# Patient Record
Sex: FEMALE | Race: WHITE | NOT HISPANIC OR LATINO | Employment: PART TIME | URBAN - METROPOLITAN AREA
[De-identification: names, ages, dates, MRNs, and addresses within clinical notes are randomized per-mention and may not be internally consistent; named-entity substitution may affect disease eponyms.]

---

## 2017-01-27 ENCOUNTER — GENERIC CONVERSION - ENCOUNTER (OUTPATIENT)
Dept: OTHER | Facility: OTHER | Age: 57
End: 2017-01-27

## 2017-10-19 ENCOUNTER — GENERIC CONVERSION - ENCOUNTER (OUTPATIENT)
Dept: OTHER | Facility: OTHER | Age: 57
End: 2017-10-19

## 2017-10-26 ENCOUNTER — GENERIC CONVERSION - ENCOUNTER (OUTPATIENT)
Dept: OTHER | Facility: OTHER | Age: 57
End: 2017-10-26

## 2017-10-26 DIAGNOSIS — Z12.31 ENCOUNTER FOR SCREENING MAMMOGRAM FOR MALIGNANT NEOPLASM OF BREAST: ICD-10-CM

## 2017-10-26 DIAGNOSIS — Z01.419 ENCOUNTER FOR GYNECOLOGICAL EXAMINATION WITHOUT ABNORMAL FINDING: ICD-10-CM

## 2017-10-26 PROCEDURE — G0145 SCR C/V CYTO,THINLAYER,RESCR: HCPCS | Performed by: OBSTETRICS & GYNECOLOGY

## 2017-10-26 PROCEDURE — 87624 HPV HI-RISK TYP POOLED RSLT: CPT | Performed by: OBSTETRICS & GYNECOLOGY

## 2017-10-30 ENCOUNTER — LAB REQUISITION (OUTPATIENT)
Dept: LAB | Facility: HOSPITAL | Age: 57
End: 2017-10-30
Payer: COMMERCIAL

## 2017-10-30 DIAGNOSIS — Z12.4 ENCOUNTER FOR SCREENING FOR MALIGNANT NEOPLASM OF CERVIX: ICD-10-CM

## 2017-10-30 DIAGNOSIS — Z11.51 ENCOUNTER FOR SCREENING FOR HUMAN PAPILLOMAVIRUS (HPV): ICD-10-CM

## 2017-10-31 LAB — HPV RRNA GENITAL QL NAA+PROBE: NORMAL

## 2017-11-02 LAB
LAB AP GYN PRIMARY INTERPRETATION: NORMAL
Lab: NORMAL

## 2017-11-06 ENCOUNTER — GENERIC CONVERSION - ENCOUNTER (OUTPATIENT)
Dept: OTHER | Facility: OTHER | Age: 57
End: 2017-11-06

## 2017-11-10 ENCOUNTER — GENERIC CONVERSION - ENCOUNTER (OUTPATIENT)
Dept: OTHER | Facility: OTHER | Age: 57
End: 2017-11-10

## 2017-11-28 ENCOUNTER — GENERIC CONVERSION - ENCOUNTER (OUTPATIENT)
Dept: OTHER | Facility: OTHER | Age: 57
End: 2017-11-28

## 2017-11-30 ENCOUNTER — GENERIC CONVERSION - ENCOUNTER (OUTPATIENT)
Dept: OTHER | Facility: OTHER | Age: 57
End: 2017-11-30

## 2017-11-30 ENCOUNTER — LAB REQUISITION (OUTPATIENT)
Dept: LAB | Facility: HOSPITAL | Age: 57
End: 2017-11-30
Payer: COMMERCIAL

## 2017-11-30 DIAGNOSIS — N89.8 OTHER SPECIFIED NONINFLAMMATORY DISORDERS OF VAGINA (CODE): ICD-10-CM

## 2017-11-30 PROCEDURE — 88305 TISSUE EXAM BY PATHOLOGIST: CPT | Performed by: OBSTETRICS & GYNECOLOGY

## 2017-11-30 PROCEDURE — 88312 SPECIAL STAINS GROUP 1: CPT | Performed by: OBSTETRICS & GYNECOLOGY

## 2017-12-14 ENCOUNTER — GENERIC CONVERSION - ENCOUNTER (OUTPATIENT)
Dept: OTHER | Facility: OTHER | Age: 57
End: 2017-12-14

## 2018-01-10 NOTE — MISCELLANEOUS
Message   Recorded as Task   Date: 09/03/2016 10:29 AM, Created By: Essence Luna   Task Name: Go to Result   Assigned To: Bertha Alba   Regarding Patient: Niles Quintanilla, Status: Active   CommentSilana Helton - 03 Sep 2016 10:29 AM     TASK CREATED  normal 3D mammogram, dense tissue, her risk is calculated as very high, 40%, shoudl consider breast MRI, possible genetic counseling or visit with breast surgeon, see what she would be interested in, I can call her if she has questions   Toya Narayan - 06 Sep 2016 11:20 AM     TASK REASSIGNED: Previously Assigned To [address] [place] [address]      pt would like to make an appt with Toya Tejeda - 06 Sep 2016 11:21 AM     TASK REASSIGNED: Previously Assigned To Ching Ruiz        Active Problems    1  Acute bacterial sinusitis (461 9) (J01 90,B96 89)   2  Breast self examination education, encounter for (V65 49) (Z71 89)   3  Cervical cancer screening (V76 2) (Z12 4)   4  Encounter for screening mammogram for malignant neoplasm of breast (V76 12)   (Z12 31)   5  Flu vaccine need (V04 81) (Z23)   6  Hypertension (401 9) (I10)   7  Visit for pelvic exam (V72 31) (Z01 419)    Current Meds   1  Nature-Throid 65 MG Oral Tablet; Therapy: [id number] to Recorded   2  Norvasc 5 MG Oral Tablet (AmLODIPine Besylate); TAKE 1 TABLET DAILY; Therapy: 56YMW0109 to (Last Rx:[id number])  Requested for: [id number] Ordered    Allergies    1  No Known Drug Allergies    2  No Known Environmental Allergies   3   No Known Food Allergies    Signatures   Electronically signed by : Mary Rodriguez, ; Sep  6 2016 11:21AM EST                       (Author)

## 2018-01-11 NOTE — MISCELLANEOUS
Message   Recorded as Task   Date: 11/03/2017 03:07 PM, Created By: Nona Brambila   Task Name: Go to Result   Assigned To: Lela Wilkins   Regarding Patient: Lukasz Sanchez, Status: Active   CommentDelmis Deutsch - 03 Nov 2017 3:07 PM     TASK CREATED  nml pap, neg hpv   Toya Narayan - 06 Nov 2017 8:06 AM     TASK EDITED  normal card mailed        Active Problems    1  Acute bacterial sinusitis (461 9) (J01 90,B96 89)   2  Breast self examination education, encounter for (V65 49) (Z71 89)   3  Cervical cancer screening (V76 2) (Z12 4)   4  Colon cancer screening (V76 51) (Z12 11)   5  Encounter for screening mammogram for malignant neoplasm of breast (V76 12)   (Z12 31)   6  Hypertension (401 9) (I10)   7  Lyme disease (088 81) (A69 20)   8  Screening for HPV (human papillomavirus) (V73 81) (Z11 51)   9  Visit for pelvic exam (V72 31) (Z01 419)    Current Meds   1  Nature-Throid 65 MG Oral Tablet; Therapy: 69HLQ1394 to Recorded   2  Norvasc 5 MG Oral Tablet (AmLODIPine Besylate); TAKE 1 TABLET DAILY; Therapy: 74GUB7153 to (Last Rx:[id number])  Requested for: 65PRS7727 Ordered    Allergies    1  No Known Drug Allergies    2  No Known Environmental Allergies   3   No Known Food Allergies    Signatures   Electronically signed by : Cat Al, ; Nov 6 2017  8:06AM EST                       (Author)

## 2018-01-16 NOTE — MISCELLANEOUS
Message   Recorded as Task   Date: 11/14/2017 10:36 AM, Created By: Farida Vera   Task Name: Follow Up   Assigned To: Keyla Mccarthy   Regarding Patient: Max Wilson, Status: In Progress   Comment:    Farida Vera - 14 Nov 2017 10:36 AM     TASK CREATED  pt was here on Friday for a follow up of a vaginal rash, she had to leave because I was running behind  Can you call and see if she is feeling better, if she wants to be seen I will fit her in where it works for her  I could come in early if needed  Trinh Stovall - 17 Nov 2017 9:55 AM     TASK IN PROGRESS   Trinh Stovall - 21 Nov 2017 4:53 PM     TASK REPLIED TO: Previously Assigned To Trinh Stovall  I tried to call patient several times and left messages  She did not call back  Farida Vera - 21 Nov 2017 9:19 PM     TASK REPLIED TO: Previously Assigned To Farida Vera  thanks        Active Problems    1  Acute bacterial sinusitis (461 9) (J01 90,B96 89)   2  Breast self examination education, encounter for (V65 49) (Z71 89)   3  Cervical cancer screening (V76 2) (Z12 4)   4  Colon cancer screening (V76 51) (Z12 11)   5  Encounter for screening mammogram for malignant neoplasm of breast (V76 12)   (Z12 31)   6  Hypertension (401 9) (I10)   7  Lyme disease (088 81) (A69 20)   8  Screening for HPV (human papillomavirus) (V73 81) (Z11 51)   9  Visit for pelvic exam (V72 31) (Z01 419)    Current Meds   1  Nature-Throid 65 MG Oral Tablet; Therapy: 83JPM8163 to Recorded   2  Norvasc 5 MG Oral Tablet (AmLODIPine Besylate); TAKE 1 TABLET DAILY; Therapy: 31GCX2665 to (Last Rx:[id number])  Requested for: [id number] Ordered    Allergies    1  No Known Drug Allergies    2  No Known Environmental Allergies   3   No Known Food Allergies    Signatures   Electronically signed by : Estela Nur, ; Nov 28 2017  5:00PM EST                       (Author)

## 2018-01-22 VITALS
HEIGHT: 66 IN | SYSTOLIC BLOOD PRESSURE: 128 MMHG | DIASTOLIC BLOOD PRESSURE: 76 MMHG | WEIGHT: 144.25 LBS | BODY MASS INDEX: 23.18 KG/M2

## 2018-01-22 VITALS
HEIGHT: 66 IN | DIASTOLIC BLOOD PRESSURE: 80 MMHG | SYSTOLIC BLOOD PRESSURE: 122 MMHG | WEIGHT: 143 LBS | BODY MASS INDEX: 22.98 KG/M2

## 2018-01-22 VITALS
SYSTOLIC BLOOD PRESSURE: 122 MMHG | DIASTOLIC BLOOD PRESSURE: 78 MMHG | HEIGHT: 66 IN | BODY MASS INDEX: 22.82 KG/M2 | WEIGHT: 142 LBS

## 2018-01-23 NOTE — MISCELLANEOUS
Message   Recorded as Task   Date: 12/08/2017 04:45 PM, Created By: J Luis Coffey   Task Name: Go to Result   Assigned To: Elvie Figueroa   Regarding Patient: Venus Vega, Status: Active   Comment:    J Luis Coffey - 08 Dec 2017 4:45 PM     TASK CREATED  please let Natalie Muller know that her vaginal bx showed acute and chronic inflammation, also there was no sign of a fungal infection    Can you call pathology and see if this could be from her Lyme disease? If I need to call the pathologist let me know and I will   thanks   Jean-Claude Dixon - 12 Dec 2017 10:14 AM     TASK REPLIED TO: Previously Assigned To [facility] the pathologist  she said that there is a RARE vulvar condition that was reported in [place] in 2014 that pt had vulvar ulcers    They treated it with topical steroids and oral Doxycycline    It can happen, but is rare    J Luis Coffey - 13 Dec 2017 7:54 AM     TASK REPLIED TO: Previously Assigned To J Luis Coffey  thank you   Toya Narayan - 13 Dec 2017 7:56 AM     TASK REPLIED TO: Previously Assigned To Elvie Figueroa  Did you want to treat it? J Luis Coffey - 14 Dec 2017 10:28 AM     TASK REPLIED TO: Previously Assigned To J Luis Coffey  no because she is on antibiotics for lyme, it is asymptomatic so just let her know the results and she should call if there are any symptoms, if she has any questions, I can call her   Toya Narayan - 14 Dec 2017 10:51 AM     TASK EDITED  pt informed        Active Problems    1  Acute bacterial sinusitis (461 9) (J01 90,B96 89)   2  Breast self examination education, encounter for (V65 49) (Z71 89)   3  Cervical cancer screening (V76 2) (Z12 4)   4  Colon cancer screening (V76 51) (Z12 11)   5  Encounter for screening mammogram for malignant neoplasm of breast (V76 12)   (Z12 31)   6  Hypertension (401 9) (I10)   7  Lyme disease (088 81) (A69 20)   8  Screening for HPV (human papillomavirus) (V73 81) (Z11 51)   9   Vaginal lesion (623  8) (N89 8)   10  Visit for pelvic exam (V72 31) (Z01 419)    Current Meds   1  Nature-Throid 65 MG Oral Tablet; Therapy: 82OMJ3218 to Recorded   2  Norvasc 5 MG Oral Tablet (AmLODIPine Besylate); TAKE 1 TABLET DAILY; Therapy: [id number] to (Last Rx:[id number])  Requested for: 19QZQ8054 Ordered    Allergies    1  No Known Drug Allergies    2  No Known Environmental Allergies   3   No Known Food Allergies    Signatures   Electronically signed by : Naif Shaikh, ; Dec 14 2017 10:51AM EST                       (Author)

## 2019-05-23 ENCOUNTER — ANNUAL EXAM (OUTPATIENT)
Dept: OBGYN CLINIC | Facility: CLINIC | Age: 59
End: 2019-05-23
Payer: COMMERCIAL

## 2019-05-23 VITALS
WEIGHT: 154 LBS | DIASTOLIC BLOOD PRESSURE: 74 MMHG | HEIGHT: 66 IN | BODY MASS INDEX: 24.75 KG/M2 | SYSTOLIC BLOOD PRESSURE: 132 MMHG

## 2019-05-23 DIAGNOSIS — Z12.31 ENCOUNTER FOR SCREENING MAMMOGRAM FOR BREAST CANCER: ICD-10-CM

## 2019-05-23 DIAGNOSIS — Z01.419 ENCOUNTER FOR ANNUAL ROUTINE GYNECOLOGICAL EXAMINATION: Primary | ICD-10-CM

## 2019-05-23 PROCEDURE — 99396 PREV VISIT EST AGE 40-64: CPT | Performed by: OBSTETRICS & GYNECOLOGY

## 2019-05-23 RX ORDER — AMLODIPINE BESYLATE 10 MG/1
5 TABLET ORAL DAILY
COMMUNITY

## 2019-05-23 RX ORDER — PSEUDOEPHEDRINE HYDROCHLORIDE 30 MG/1
TABLET ORAL
COMMUNITY
End: 2020-12-10

## 2019-05-23 RX ORDER — THYROID, PORCINE 60 MG/1
TABLET ORAL
COMMUNITY
Start: 2014-11-03 | End: 2020-12-10

## 2019-08-21 ENCOUNTER — HOSPITAL ENCOUNTER (OUTPATIENT)
Dept: MAMMOGRAPHY | Facility: MEDICAL CENTER | Age: 59
Discharge: HOME | End: 2019-08-21
Payer: COMMERCIAL

## 2019-08-21 VITALS — WEIGHT: 150 LBS | BODY MASS INDEX: 24.11 KG/M2 | HEIGHT: 66 IN

## 2019-08-21 DIAGNOSIS — Z12.31 ENCOUNTER FOR SCREENING MAMMOGRAM FOR BREAST CANCER: ICD-10-CM

## 2019-08-21 PROCEDURE — 77063 BREAST TOMOSYNTHESIS BI: CPT

## 2019-08-21 PROCEDURE — 77067 SCR MAMMO BI INCL CAD: CPT

## 2020-05-27 ENCOUNTER — TELEPHONE (OUTPATIENT)
Dept: OBGYN CLINIC | Facility: CLINIC | Age: 60
End: 2020-05-27

## 2020-12-10 ENCOUNTER — ANNUAL EXAM (OUTPATIENT)
Dept: OBGYN CLINIC | Facility: CLINIC | Age: 60
End: 2020-12-10
Payer: COMMERCIAL

## 2020-12-10 VITALS
BODY MASS INDEX: 23.88 KG/M2 | WEIGHT: 148.6 LBS | HEIGHT: 66 IN | SYSTOLIC BLOOD PRESSURE: 132 MMHG | DIASTOLIC BLOOD PRESSURE: 88 MMHG

## 2020-12-10 DIAGNOSIS — Z01.419 ENCOUNTER FOR ANNUAL ROUTINE GYNECOLOGICAL EXAMINATION: ICD-10-CM

## 2020-12-10 DIAGNOSIS — Z12.31 ENCOUNTER FOR SCREENING MAMMOGRAM FOR BREAST CANCER: Primary | ICD-10-CM

## 2020-12-10 PROCEDURE — 99396 PREV VISIT EST AGE 40-64: CPT | Performed by: OBSTETRICS & GYNECOLOGY

## 2020-12-10 RX ORDER — LEVOTHYROXINE, LIOTHYRONINE 38; 9 UG/1; UG/1
60 TABLET ORAL DAILY
COMMUNITY
Start: 2020-11-27

## 2020-12-10 RX ORDER — LISINOPRIL 10 MG/1
10 TABLET ORAL DAILY
COMMUNITY
Start: 2020-11-27 | End: 2021-11-27

## 2021-08-30 ENCOUNTER — HOSPITAL ENCOUNTER (OUTPATIENT)
Dept: MAMMOGRAPHY | Facility: MEDICAL CENTER | Age: 61
Discharge: HOME | End: 2021-08-30
Payer: COMMERCIAL

## 2021-08-30 VITALS — WEIGHT: 148 LBS | BODY MASS INDEX: 23.78 KG/M2 | HEIGHT: 66 IN

## 2021-08-30 DIAGNOSIS — Z12.31 ENCOUNTER FOR SCREENING MAMMOGRAM FOR BREAST CANCER: ICD-10-CM

## 2021-08-30 PROCEDURE — 77063 BREAST TOMOSYNTHESIS BI: CPT

## 2021-08-30 PROCEDURE — 77067 SCR MAMMO BI INCL CAD: CPT

## 2021-09-09 DIAGNOSIS — R92.2 DENSE BREAST TISSUE ON MAMMOGRAM: Primary | ICD-10-CM

## 2021-11-29 DIAGNOSIS — Z11.59 SPECIAL SCREENING EXAMINATION FOR VIRAL DISEASE: Primary | ICD-10-CM

## 2022-03-31 ENCOUNTER — ANNUAL EXAM (OUTPATIENT)
Dept: OBGYN CLINIC | Facility: CLINIC | Age: 62
End: 2022-03-31
Payer: COMMERCIAL

## 2022-03-31 VITALS
SYSTOLIC BLOOD PRESSURE: 114 MMHG | HEIGHT: 66 IN | BODY MASS INDEX: 24.2 KG/M2 | WEIGHT: 150.6 LBS | DIASTOLIC BLOOD PRESSURE: 70 MMHG

## 2022-03-31 DIAGNOSIS — Z12.31 ENCOUNTER FOR SCREENING MAMMOGRAM FOR BREAST CANCER: ICD-10-CM

## 2022-03-31 DIAGNOSIS — N95.2 VAGINAL ATROPHY: ICD-10-CM

## 2022-03-31 DIAGNOSIS — Z12.4 CERVICAL CANCER SCREENING: Primary | ICD-10-CM

## 2022-03-31 DIAGNOSIS — Z11.51 SCREENING FOR HPV (HUMAN PAPILLOMAVIRUS): ICD-10-CM

## 2022-03-31 DIAGNOSIS — Z01.419 ENCOUNTER FOR ANNUAL ROUTINE GYNECOLOGICAL EXAMINATION: ICD-10-CM

## 2022-03-31 PROCEDURE — G0145 SCR C/V CYTO,THINLAYER,RESCR: HCPCS | Performed by: OBSTETRICS & GYNECOLOGY

## 2022-03-31 PROCEDURE — G0476 HPV COMBO ASSAY CA SCREEN: HCPCS | Performed by: OBSTETRICS & GYNECOLOGY

## 2022-03-31 PROCEDURE — 99396 PREV VISIT EST AGE 40-64: CPT | Performed by: OBSTETRICS & GYNECOLOGY

## 2022-03-31 NOTE — PROGRESS NOTES
Assessment/Plan:    pap is up to date    mammogram reviewed with her including breast density  RX given   Given ABUS RX from last year  She will check on coverage and schedule  Discussed self breast exams    colon cancer screening -  Colonoscopy done in 2017, repeat in 5-10    Vaginal dryness  -we reviewed lubricants including oils and silicone based  We also discussed different options for vaginal moisturizer  I reviewed vaginal estrogen with her  We discussed risks and side effects the fact that it is a low-dose and the risk is low  She will consider this and call if she wants a prescription  Decreased libido  - we discussed many causes of this  She had read about a supplement ( Tribula?)that may help but I am unaware of any good evidence for supplements this time  discussed preventive care, regular exercise and a healthy diet      No problem-specific Assessment & Plan notes found for this encounter  Diagnoses and all orders for this visit:    Encounter for annual routine gynecological examination    Encounter for screening mammogram for breast cancer  -     Mammo screening bilateral w 3d & cad; Future          Subjective:      Patient ID: Sabina Galan is a 58 y o  female  Patient here for yearly  She is having more vaginal dryness  She was using Replens but feels it burns sometimes  Normal 3D mammogram in August showed dense tissue and intermediate risk  Her mother had breast cancer in her 76s  normal Pap, negative HPV in October 2017  The following portions of the patient's history were reviewed and updated as appropriate: allergies, current medications, past family history, past medical history, past social history, past surgical history and problem list     Review of Systems   Constitutional: Negative  Gastrointestinal: Negative  Genitourinary: Positive for dyspareunia and vaginal pain  Objective: There were no vitals taken for this visit  Physical Exam  Vitals reviewed  Constitutional:       Appearance: She is well-developed  Neck:      Thyroid: No thyromegaly  Trachea: No tracheal deviation  Cardiovascular:      Rate and Rhythm: Normal rate and regular rhythm  Pulmonary:      Effort: Pulmonary effort is normal       Breath sounds: Normal breath sounds  Chest:   Breasts: Breasts are symmetrical       Right: No inverted nipple, mass, nipple discharge, skin change or tenderness  Left: No inverted nipple, mass, nipple discharge, skin change or tenderness  Abdominal:      General: There is no distension  Palpations: Abdomen is soft  There is no mass  Tenderness: There is no abdominal tenderness  Genitourinary:     Labia:         Right: No rash, tenderness, lesion or injury  Left: No rash, tenderness, lesion or injury  Vagina: Normal       Cervix: No cervical motion tenderness, discharge or friability  Adnexa:         Right: No mass, tenderness or fullness  Left: No mass, tenderness or fullness          Rectum: Normal       Comments: Small urethral caruncle

## 2022-04-01 LAB
HPV HR 12 DNA CVX QL NAA+PROBE: NEGATIVE
HPV16 DNA CVX QL NAA+PROBE: NEGATIVE
HPV18 DNA CVX QL NAA+PROBE: NEGATIVE

## 2022-04-06 LAB
LAB AP GYN PRIMARY INTERPRETATION: NORMAL
Lab: NORMAL

## 2022-10-05 ENCOUNTER — CONSULT (OUTPATIENT)
Dept: NEUROLOGY | Facility: CLINIC | Age: 62
End: 2022-10-05
Payer: COMMERCIAL

## 2022-10-05 VITALS
TEMPERATURE: 96.8 F | HEART RATE: 76 BPM | HEIGHT: 66 IN | WEIGHT: 158 LBS | SYSTOLIC BLOOD PRESSURE: 150 MMHG | DIASTOLIC BLOOD PRESSURE: 70 MMHG | BODY MASS INDEX: 25.39 KG/M2 | OXYGEN SATURATION: 99 %

## 2022-10-05 DIAGNOSIS — G40.009 PARTIAL IDIOPATHIC EPILEPSY WITH SEIZURES OF LOCALIZED ONSET, NOT INTRACTABLE, WITHOUT STATUS EPILEPTICUS (HCC): Primary | ICD-10-CM

## 2022-10-05 PROCEDURE — 99205 OFFICE O/P NEW HI 60 MIN: CPT | Performed by: PSYCHIATRY & NEUROLOGY

## 2022-10-05 RX ORDER — LEVETIRACETAM 750 MG/1
750 TABLET, EXTENDED RELEASE ORAL
COMMUNITY
Start: 2022-09-21 | End: 2022-10-05 | Stop reason: SDUPTHER

## 2022-10-05 RX ORDER — LEVETIRACETAM 750 MG/1
750 TABLET, EXTENDED RELEASE ORAL
Qty: 90 TABLET | Refills: 3 | Status: SHIPPED | OUTPATIENT
Start: 2022-10-05

## 2022-10-05 NOTE — PATIENT INSTRUCTIONS
Continue levetiracetam  mg nightly  Have EEG and MRI done  Have blood work done  Let us know if there are seizures  Schedule visit with eye doctor to evaluate right ptosis (low eyelid)  SEIZURE SAFETY    Dont let fear of seizures keep you at home  Be smart about your activities to make sure you are safe  The guidelines below can help you be as safe as possible  Make sure the people around you are aware of: What happens when you have a seizure  Correct seizure first aid  First aid for choking (consider taking a basic life support class)  When they should know to call 911 or for medical help    Avoid common triggers of seizures:  Missing your medications  Not getting enough sleep  Drinking alcohol  Using illegal drugs    Safety measures for at home:  In the bathroom:   Do not take baths in the bathtub  Instead, take only showers  Try to have a family member available while you are in the shower  Make sure the drain in the bathtub/shower is working properly to avoid pooling of water  You can consider a fitted shower seat  Recessed soap trays can minimize injury if you would happen to fall in the shower  Bathroom doors can be hung to open outwards, so that the door can still be opened if you fall against the door  Do not lock the bathroom door  Use an Occupied sign on the door or other signal to let others know you are in the bathroom  Safety locks can be obtained from the [employer]  On your water heater, set your water temperature to a warm temperature that is not scalding to avoid burns from very hot water  Put non-skid strips/ in the bathtub  Use an electric shaver  Avoid any electrical appliances (including electric shaver) in the bathroom or near water  Use shatterproof glass for mirrors  In the kitchen:   When possible, cook using a microwave  Only cook or use electrical appliances when someone else is in the house and available     As much as possible, grill food and avoid fryers or frying food  Use the back burners of the stove and turn the pot handles toward the back of the stove  Avoid carrying hot pans, pots of boiling water, or very hot food  Serve food or liquids directly from the stove  At the least, minimize the distance hot food is carried  Use precut foods or food processers to limit the need to use knives  Use plastic or durable cups, dishes, and containers instead of breakable glass items  In the bedroom  Low level and wide beds (like a futon) can reduce risk of injury of falling out of bed  If there is a high risk of falling out of bed, you can consider simply putting the mattress on the floor  Avoid sleeping on top bunks of bunk beds  Place a soft carpet on the floor  Around the house  Pad sharp corners of tables, chairs, etc  Round tables and furniture can be considered to avoid sharp corners  Avoid open flames  Place a screen in front of fireplaces and dont build a fire alone  Allow for open spaces with furniture  Avoid loose throw rugs or slippery floors  Non-slip rosalie or cushioned rosalie can help reduce injury from a fall  Fireproof fabrics and furniture are best, and especially important if you smoke  Doors and windows with safety glass are safer if someone falls against them  Avoid lights and heaters that could easily be knocked over  Use curling irons or clothing irons that have automatic shut off switches  Make sure motor driven equipment or lawn mowers have dead mans handles or seats so they will turn off if you release pressure  Safety measures when away from home  [address] [place]  law mandates that you cannot drive for 6 months after your most recent seizure  [place] law mandates that you cannot drive for 6 months after your most recent seizure  Work/Travel  Wear a medical alert bracelet or necklace at all times    Wear a helmet and use protective clothing/equipment when appropriate  Consider telling co-workers and travel companions that you have epilepsy and what to do if you have a seizure  Avoid irregular shifts or disruptions of a regular sleep pattern  Take your medications on time and keep an updated list of medications in your purse or wallet  Do not climb to heights or operate heavy machinery  Stand back from the edges of roads or bus/train platforms when traveling  If you wander when you have a seizure, take a friend along for the trip  Recreation  Swimming can be dangerous  Do not swim alone, in open water, or in murky water that you cannot see the bottom  Caregivers should be with you in the pool at all times and must be physically able to get you out of the water  Use a flotation device  Scuba diving is not recommended since during a seizure people are unaware of their surroundings  Having a seizure underwater can be deadly and can endanger the lives of others  Kayaking and canoeing can be especially dangerous  People with epilepsy are at a higher risk of becoming trapped underneath a canoe or kayak  Wear a helmet when playing contact sports, biking, or if there is a risk of falling  Patients with epilepsy are at a higher risk of head injury when playing contact sports  Avoid riding a bike, running, or other activities around busy roads, steep hills, or secluded areas  Exercise on soft surfaces  Theme Yadav: many people with epilepsy can go on rides depending on their type of seizures  For some people, stress or excitement can trigger a seizure  Rollercoasters (especially if you are upside-down) are more dangerous for people with epilepsy  Medications  Take your medications on time  If you have trouble remembering, set alarms on your phone  You can visit www  ztijkye2mzqhure  com to set up reminders through text message to help you remember to take your medications  Use a pillbox to help you keep track of your medications    When out of the house, take any needed medications with you  Consider keeping one or two extra doses with you in case you are unexpectedly away from home longer than planned  If you realize you missed a dose of your medications and it is less than 2 hours until your next dose, skip the missed dose  Do not double up your medication dose  If it is more than 2 hours until your next dose, you can take the missed dose  Avoid drinking alcohol, since this can enhance effects of your seizure medications  Be aware of common and major side effects of your medications  Keep your medications out of the reach of children  Parenting:  Childproof your home as much as possible  It is possible that you could drop your baby if you have a seizure while holding or feeding them  If you are nursing a baby, sit on the floor or bed with your back supported so the baby will not fall far if you should lose consciousness  Feed the baby while he or she is seated in an infant seat  Dress, change, and sponge bathe the baby on the floor  Move the baby around in a stroller or small crib  Keep a young baby in a playpen when you are alone, and a toddler in an indoor play yard, or childproof one room and use safety johnson at the doors  When out of the house, use a bungee-type cord or restraint harness so your child cannot wander away if you have a seizure that affects your awareness

## 2022-10-05 NOTE — PROGRESS NOTES
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 manifest as a cluster of episodes of amnesia and repeating questions on 9/19/2022  Her neurological exam reveals 1/3 delayed recall and mild right ptosis (emerged in the last 1-1 5 years)  EEG shows right temporal epileptiform discharges and right temporal theta slowing  Initial MRI unrevealing, but she requires a seizure protocol study on our 3T machine with contrast to further evaluate for source of seizure including a abnormality or cortical development or a small/subtle neoplasm  A 48 hour EEG is ordered to evaluate for undetected seizures which are certainly possible given the subtle nature of her focal seizures  Her levetiracetam dose is lower than is typically used, but she did not tolerate 500 mg bid initially  Checking level and will consider trial of higher dose if level is near or below the lower limit  Additional seizures would initially prompt increase in levetiracetam dose, but it is possible she may not tolerate higher doses in which case we would transition to another medication  We discussed the pathophysiology of epilepsy/seizure and seizure safety/precautions  We discussed factors that can lower seizure threshold and the side effects of antiepileptic medications  We discussed today in detail that she cannot drive until establishing 6 months of seizure freedom  He even though she did not have clear loss of awareness there were memory problems that could affect her ability to drive safely and subtle brief altered awareness during her seizures is not excluded  We also discussed that sleep deprivation is high risk and can provoke seizures  He would be best for her to avoid night shifts  Patient Instructions   1  Continue levetiracetam  mg nightly  2  Have EEG and MRI done     3  Have blood work done  4  Let us know if there are seizures  5  Schedule visit with eye doctor to evaluate right ptosis (low eyelid)  Diagnoses and all orders for this visit:    Partial idiopathic epilepsy with seizures of localized onset, not intractable, without status epilepticus ([facility] )  -     MRI brain seizure wo and w contrast; Future  -     Ambulatory EEG 48 Hours; Standing  -     levetiracetam 750 MG TB24 extended-release tablet; Take 1 tablet (750 mg total) by mouth daily at bedtime  -     Levetiracetam level; Future    Other orders  -     Discontinue: levetiracetam 750 MG TB24 extended-release tablet; Take 750 mg by mouth        Josseline Beaulieu is a 58 y o  right handed female presenting to the [place] [address] [facility] for evaluation of recently discovered temporal lobe epilepsy  Hospital records reviewed  She [place]  She presented with confusion  She went to bed the night prior woke up not feeling herself, she was confused and disoriented and had retrograde amnesia  By the time she was evaluated by Neurology her symptoms had completely resolved  History was provided today by the patient and her   Went to [place] with dogs around 9 am   Her  then went surfing and she worked on packing at home  He came home around 10:30 and she reported feeling off and not right  Didn't recall going to [place], but did recall details of the night prior  She had no recall of eating breakfast or walking back from [place]  He left and went to the beach and then she joined him shortly after as he had suggested that she do  She went and read a book on the beach and was later able to recall the details  They returned from the beach and she noticed the boxes she had packed, but didn't recall how they had gotten packed  Ended up going to the local hospital around 5 pm after talking to family member that is a physician   She had been at the beach that afternoon with no abnormal symptoms or memory problems  En route to the hospital she repeated questions about what her medications were  Repeated the same question multiple times over a minute or less  In the hospital her memory of going to the [employer] returned around 8 pm, she recalled the specific bagel and unusual route home  The day before they had guests over their house and she drink caffeinated coffee shortly before bed which is unusual for her and she felt she did not sleep well that night  They were getting ready to [place] up their shore house for the summer  Within the last year there was syncope with head strike  She was sick at that time  Lightheaded and then syncope with collapse and hit face on nightstand and broke table   came and found her and she was responsive  I had noticed right ptosis on exam in asked her about it  Notice right ptosis in the last year and has been planning to see her eye doctor about it  No other vision problems noted  No pupillary asymmetry noted  The ptosis does not change or get worse at the end of the day  Only on the right  She describes a few episodes of "occular migraine" for 30 seconds, maybe every couple months  Sees flashing lights both sides of visual field  Maybe just 3-4 in life  No headache  Didn't happen with her seizures  Current AEDs:  Levetiracetam  mg qhs  Medication side effects:  500 Q 12 in the hospital caused her to be very tired  Dose was lowered to 750 mg once a day before she left and she still felt tired initially, but this has mostly resolved  Medication adherence: Yes    Event/Seizure semiology:   Amnesia, repeating questions    Special Features  Status epilepticus: no  Self Injury Seizures: no  Precipitating Factors: Sleep deprivation    Epilepsy Risk Factors:  Syncope with head strike and likely concussion within the last year  Normal birth and early development   No history of febrile seizures  No history of CNS infection  No family history of epilepsy  Prior AEDs:  None    Prior Evaluation:  CTA head and neck 9/20/2022: normal    Brain MRI without contrast 9/20/2020:  1  Mild small vessel ischemic changes  2  No evidence of acute/subacute ischemia, bleed, midline shift or hydrocephalus  Routine EEG 9/20/2022 [facility]): "This is an abnormal 21 minute EEG awake, drowsy and asleep due to focal slowing and epileptiform discharges over the right temporal region  Focal slowing over the right temporal region is etiologically nonspecific but could be structural in the origin  Epileptiform discharges over the right temporal region are indicative of an epileptogenic focus in this area  No clinical or electrographic seizures were recorded"  Body of report: "Intermittent theta activity was present over the right temporal   region   Rare spike and wave discharges were present over the   right temporal region with amplitude maximal and phase reversal   at T4/C4  No clinical or electrographic seizures are present  "        History Reviewed: The following were reviewed and updated as appropriate: allergies, current medications, past family history, past medical history, past social history, past surgical history and problem list     Psychiatric History:  None    Social History:   Driving: Yes  Lives Alone: No  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  Objective    /70 (BP Location: Left arm, Patient Position: Sitting, Cuff Size: Adult)   Pulse 76   Temp (!) 96 8 °F (36 °C) (Temporal)   Ht 5' 6" (1 676 m)   Wt 71 7 kg (158 lb)   SpO2 99%   BMI 25 50 kg/m²      General Exam  General: well developed, no acute distress  HEENT: mucous membranes moist, anicteric sclera  Neck:  good ROM  Extremities: no clubbing, cyanosis or edema  Skin: no rash on visible skin      Neurological Exam  Mental Status: awake, alert, and fully oriented to person, place, time, and situation  Attention (world in reverse) intact  1/3 recall at 10 minutes  Fund of knowledge is appropriate for age and education  There is no neglect  Language: fluency, naming, comprehension normal        Cranial Nerves: Rigth ptosis  Rigth pupil slightly larger than left in low light, both respond briskly to light  Visual fields full to confrontation  Fundoscopic exam showed sharp discs and normal vasculature  Extraocular motions intact with full versions, normal pursuits and saccades  Facial strength full and symmetric  Facial sensation intact in V1-V3  Hearing intact to finger rub bilaterally  Tongue protrudes to midline  Palate elevates symmetrically  Speech clear without notable dysarthria  Shoulder shrug activation full and symmetric  Motor: Normal bulk and tone  No pronator drift  Strength is 5/5 proximally and distally in all 4 extremities  No involuntary movements  Sensory: Sensation intact to light touch distally in all extremities  Coordination: Normal finger-to-nose  Station and gait: Casual and tandem gait normal  Normal Romberg  Reflexes: Reflexes 2+ throughout and symmetric (brachioradialis, patella, achilles)  Kita Stark U  18  [facility]  [facility], [facility]          ROS:    Review of Systems   Constitutional: Negative  Negative for appetite change and fever  HENT: Negative  Negative for hearing loss, tinnitus, trouble swallowing and voice change  Eyes: Negative  Negative for photophobia and pain  Respiratory: Negative  Negative for shortness of breath  Cardiovascular: Negative  Negative for palpitations  Gastrointestinal: Negative  Negative for nausea and vomiting  Endocrine: Negative  Negative for cold intolerance  Genitourinary: Negative  Negative for dysuria, frequency and urgency  Musculoskeletal: Negative    Negative for myalgias and neck pain  Skin: Negative  Negative for rash  Neurological: Positive for seizures, light-headedness and headaches  Negative for dizziness, tremors, syncope, facial asymmetry, speech difficulty, weakness and numbness  Hematological: Negative  Does not bruise/bleed easily  Psychiatric/Behavioral: Negative  Negative for confusion, hallucinations and sleep disturbance  ROS reviewed and updated as appropriate  Total time spent on day of encounter: 70 minutes  The time was spent reviewing testing, obtaining/reviewing history, performing an exam, counseling/educating, ordering medication/tests/procedures, referring/communicating with other healthcare providers, documenting clinical information in the EMR, independently interpreting EEG/imaging results, and/ or coordinating care

## 2022-10-07 ENCOUNTER — TELEPHONE (OUTPATIENT)
Dept: NEUROLOGY | Facility: CLINIC | Age: 62
End: 2022-10-07

## 2022-11-01 ENCOUNTER — PATIENT MESSAGE (OUTPATIENT)
Dept: NEUROLOGY | Facility: CLINIC | Age: 62
End: 2022-11-01

## 2022-11-11 ENCOUNTER — TELEPHONE (OUTPATIENT)
Dept: NEUROLOGY | Facility: CLINIC | Age: 62
End: 2022-11-11

## 2022-11-11 NOTE — TELEPHONE ENCOUNTER
Pt left VM  Stated that her  dropped off CD and EEG tracing,I was wondering if you had chance to look it   Please give me a call #[phone]

## 2022-11-14 NOTE — TELEPHONE ENCOUNTER
Dr Raman - per patient, this was dropped off at the [facility] office  Were you able to review? It was supposedly scanned to chart per patient, but I cant locate it anywhere  [address] [place] [address] office and spoke to [facility] disc was unable to be uploaded due to formating

## 2022-11-15 NOTE — TELEPHONE ENCOUNTER
I reviewed the tracing today  I do not agree that there are convincing epileptiform discharges  There are mild temporal theta activities during drowsiness, at times with sharp contour, that are likely benign  It will be helpful to have the EEG completed that was ordered at her visit to further evaluate for an underlying seizure tendency and confirm that the finding is benign

## 2022-11-22 ENCOUNTER — HOSPITAL ENCOUNTER (OUTPATIENT)
Dept: RADIOLOGY | Age: 62
Discharge: HOME | End: 2022-11-22

## 2022-11-22 DIAGNOSIS — G40.009 PARTIAL IDIOPATHIC EPILEPSY WITH SEIZURES OF LOCALIZED ONSET, NOT INTRACTABLE, WITHOUT STATUS EPILEPTICUS (HCC): ICD-10-CM

## 2022-11-22 RX ADMIN — GADOBUTROL 7 ML: 604.72 INJECTION INTRAVENOUS at 12:50

## 2022-11-28 ENCOUNTER — HOSPITAL ENCOUNTER (OUTPATIENT)
Dept: NEUROLOGY | Facility: CLINIC | Age: 62
Discharge: HOME | End: 2022-11-28

## 2022-11-28 DIAGNOSIS — G40.009 PARTIAL IDIOPATHIC EPILEPSY WITH SEIZURES OF LOCALIZED ONSET, NOT INTRACTABLE, WITHOUT STATUS EPILEPTICUS (HCC): ICD-10-CM

## 2022-11-29 ENCOUNTER — HOSPITAL ENCOUNTER (OUTPATIENT)
Dept: NEUROLOGY | Facility: CLINIC | Age: 62
Discharge: HOME | End: 2022-11-29

## 2022-11-29 DIAGNOSIS — G40.009 PARTIAL IDIOPATHIC EPILEPSY WITH SEIZURES OF LOCALIZED ONSET, NOT INTRACTABLE, WITHOUT STATUS EPILEPTICUS (HCC): ICD-10-CM

## 2022-11-30 ENCOUNTER — HOSPITAL ENCOUNTER (OUTPATIENT)
Dept: NEUROLOGY | Facility: CLINIC | Age: 62
Discharge: HOME | End: 2022-11-30

## 2022-11-30 DIAGNOSIS — G40.009 PARTIAL IDIOPATHIC EPILEPSY WITH SEIZURES OF LOCALIZED ONSET, NOT INTRACTABLE, WITHOUT STATUS EPILEPTICUS (HCC): ICD-10-CM

## 2022-12-01 ENCOUNTER — TELEPHONE (OUTPATIENT)
Dept: NEUROLOGY | Facility: CLINIC | Age: 62
End: 2022-12-01

## 2022-12-01 NOTE — TELEPHONE ENCOUNTER
----- Message from Kulwant Black RN sent at 12/1/2022  7:40 AM EST -----  Regarding: FW: EEG tracing  Contact: [phone]    ----- Message -----  From: Laron Cloud  Sent: 11/30/2022   5:06 PM EST  To: Neurology [place] [facility]  Subject: EEG tracing                                      Yamilka Rodriguez,  I don't know if Dr Stephanie Gomez has reviewed my MRI (from 11/22) and EEG tracings (from 11/28-11/30), but it appears there is nothing to indicate seizure activity  In view of the fact that Dr Stephanie Gomez did not agree with the neurologist who read my original EEG that was done 9/20/22 in [place], I would like to request that my 's license be reinstated  I also would like to discontinue the medication I am on for "seizures"- Levetiracetam ER  I do not want to take medication that I do not need  Please advise what the next step is in the process  If the doctor needs to see me before he completes the [employer] paperwork, please contact me to schedule an appointment as soon as possible  I do not want to wait until my next scheduled appointment which is on February 2, 2023     Thank you  Shruthi Bob
Spoke to patient  appt scheduled for 12/7/2022 in [place] 
Director, Leti(Attending)

## 2022-12-02 ENCOUNTER — APPOINTMENT (OUTPATIENT)
Dept: LAB | Facility: MEDICAL CENTER | Age: 62
End: 2022-12-02

## 2022-12-02 DIAGNOSIS — G40.009 PARTIAL IDIOPATHIC EPILEPSY WITH SEIZURES OF LOCALIZED ONSET, NOT INTRACTABLE, WITHOUT STATUS EPILEPTICUS (HCC): ICD-10-CM

## 2022-12-05 LAB — LEVETIRACETAM SERPL-MCNC: 13.5 UG/ML (ref 10–40)

## 2022-12-07 ENCOUNTER — OFFICE VISIT (OUTPATIENT)
Dept: NEUROLOGY | Facility: CLINIC | Age: 62
End: 2022-12-07

## 2022-12-07 VITALS
WEIGHT: 152.5 LBS | BODY MASS INDEX: 24.61 KG/M2 | HEART RATE: 94 BPM | SYSTOLIC BLOOD PRESSURE: 145 MMHG | DIASTOLIC BLOOD PRESSURE: 84 MMHG

## 2022-12-07 DIAGNOSIS — G40.009 PARTIAL IDIOPATHIC EPILEPSY WITH SEIZURES OF LOCALIZED ONSET, NOT INTRACTABLE, WITHOUT STATUS EPILEPTICUS (HCC): Primary | ICD-10-CM

## 2022-12-07 DIAGNOSIS — H02.401 PTOSIS OF EYELID, RIGHT: ICD-10-CM

## 2022-12-07 RX ORDER — LEVETIRACETAM 250 MG/1
TABLET ORAL
Qty: 21 TABLET | Refills: 0 | Status: SHIPPED | OUTPATIENT
Start: 2022-12-07 | End: 2022-12-21

## 2022-12-07 NOTE — PROGRESS NOTES
[facility]  Follow Up Visit    Impression/Plan    Ms Daniel Bella is a 58 y o  female returning for follow-up after initial consultation October 5, 2022 when it was my impression that she had focal epilepsy manifest as a single cluster of seizures involving episodes of amnesia on September 19, 2022  An outside EEG was read as showing right temporal epileptiform discharges and right temporal theta frequency slowing  However, I have since personally reviewed the outside EEG tracing and it is my impression that there are no epileptiform discharges to suggest an underlying seizure tendency  Additionally, a 48-hour ambulatory EEG was normal and suggested no elevated risk for seizure  A 3 Sydnee seizure protocol MRI was also unrevealing for source of seizure  Based on the reinterpretation of the initial EEG as well as the repeat EEG I now believe the most likely diagnosis is transient global amnesia, rather than seizure  I will have her taper off of levetiracetam   I also believe she does not need to abstain from driving for 6 months from the time of her event in September  I support return to driving within 2 weeks of stopping her levetiracetam   I will complete a [employer] seizure reporting form indicating this  I did explain that while I support her return to driving in this timeframe, [employer] makes the final decision and it may be that they will require a longer period of abstinence from driving  She has noticed a right ptosis developed over the last year or so  And it has been present on both exams during her visits this year  There is also slight pupillary asymmetry, but this is likely physiological (not consistent with Deyanira syndrome, ptosis is on the right and smaller pupil is on the left)  I am referring her to ophthalmology for further evaluation  Patient Instructions   1   Decrease levetiracetam to 250 mg twice daily or one week, then 250 mg nightly for one week and then stop  2  Schedule visit with ophthalmology  3  No driving until determination is made by [facility] DOT  4  Return in about 6 months  Diagnoses and all orders for this visit:    Partial idiopathic epilepsy with seizures of localized onset, not intractable, without status epilepticus [facility])  -     Ambulatory Referral to Ophthalmology; Future  -     levETIRAcetam (Keppra) 250 mg tablet; Take 1 tablet (250 mg total) by mouth 2 (two) times a day for 7 days, THEN 1 tablet (250 mg total) every evening for 7 days  Ptosis of eyelid, right  -     Ambulatory Referral to Ophthalmology; Future        Subjective    Elton Maharaj is returning to the [place] [address] [facility] for follow up  Interval Events:   Seizures since last visit: None    No events concerning for seizures since last visit  She arrives with her   Both routine EEG and 48-hour ambulatory EEG were normal   Seizure protocol MRI was also normal   I personally reviewed the raw tracing from the routine EEG completed in [place] and disagreed with the report that suggested there were epileptiform discharges-on my review there were no epileptiform discharges  The subsequent 48-hour ambulatory EEG confirmed this  We again reviewed briefly the symptoms she had this September in [place] that prompted hospitalization  Her symptoms occurred on 1 day both in the morning and again somewhat in the evening  The symptoms could be consistent with transient global amnesia  At last visit was noted that there was mild right ptosis and anisocoria with right pupil slightly larger than left  She does feel that her right eyelid is droopy at times, her  does not notice  At the end of the day she feels like it is hard to hold her eyelids up  She has not been evaluated by optometry or ophthalmology since the last visit  Prior Evaluation:  See prior office note            Objective    /84 (BP Location: Left arm, Patient Position: Sitting, Cuff Size: Adult)   Pulse 94   Wt 69 2 kg (152 lb 8 oz)   BMI 24 61 kg/m²      General Exam  No acute distress  Neurologic Exam  Mental Status:  Alert and oriented x 3  Language: normal fluency and comprehension  Cranial Nerves:  VFFTC  EOMI, no nystagums  Face symmetric  No dysarthria  Mild right ptosis, not significantly changed after 60 seconds of upward gaze  Also no diplopia or other eye movement abnormality after extended upward gaze  Right pupil is about 1-2 mm bigger than the left in the dark  Motor:  No drift  Strength 5/5 throughout  Coordination: Finger to nose intact  Gait: Normal casual gait  Total time spent on day of encounter: 45 minutes  The time was spent reviewing testing, obtaining/reviewing history, performing an exam, counseling/educating, ordering medication/tests/procedures, referring/communicating with other healthcare providers, documenting clinical information in the EMR, independently interpreting EEG/imaging results, and/ or coordinating care

## 2022-12-07 NOTE — PATIENT INSTRUCTIONS
Decrease levetiracetam to 250 mg twice daily or one week, then 250 mg nightly for one week and then stop  Schedule visit with ophthalmology  No driving until determination is made by [facility] DOT  Return in about 6 months

## 2023-03-22 ENCOUNTER — EVALUATION (OUTPATIENT)
Dept: PHYSICAL THERAPY | Facility: MEDICAL CENTER | Age: 63
End: 2023-03-22

## 2023-03-22 DIAGNOSIS — M54.2 CERVICAL PAIN: Primary | ICD-10-CM

## 2023-03-22 NOTE — LETTER
2023    Ayana Mirza MD  [address] [address]    Patient: Elton Maharaj   YOB: 1960   Date of Visit: 3/22/2023     Encounter Diagnosis     ICD-10-CM    1  Cervical pain  M54 2           Dear Dr Gross Mask: Thank you for your recent referral of Elton Maharaj  Please review the attached evaluation summary from [address] [place] [address] recent visit  Please verify that you agree with the plan of care by signing the attached order  If you have any questions or concerns, please do not hesitate to call  I sincerely appreciate the opportunity to share in the care of one of your patients and hope to have another opportunity to work with you in the near future  Sincerely,    Tra Diane      Referring Provider:      I certify that I have read the below Plan of Care and certify the need for these services furnished under this plan of treatment while under my care  Ayana Mirza MD  [facility]  Via Fax: [phone]          PT Evaluation     Today's date: 3/22/2023  Patient name: Elton Maharaj  : 1960  MRN: [id number]  Referring provider: Carlito Rosas MD  Dx:   Encounter Diagnosis     ICD-10-CM    1  Cervical pain  M54 2                      Assessment  Assessment details: Elton Maharaj is a pleasant 61 y o  female who presents with cervical pain with right UE radiculopathy  The patient's greatest concerns are worry over not knowing what's wrong and fear of not being able to keep active  No further referral appears necessary at this time based upon examination results      Primary movement impairment diagnosis of poor cervicothoracic movement coordination resulting in pathoanatomical symptoms of Cervical pain  (primary encounter diagnosis) and limiting her ability to exercise or recreation, perform household chores, perform yard work and turn to look over shoulder  Impairments include:  1) poor cervicothoracic movement coordination - addressing with neuromotor retraining   2) central sensitization - addressing with extensive counseling regarding pain neuroscience, diagnosis, prognosis, care options, and care planning  3) UE neural tension - addressing with mobs and mobility exercises   4) poor postural control - addressing with postural retraining    Impairments: abnormal coordination, abnormal muscle firing, abnormal muscle tone, abnormal or restricted ROM, abnormal movement, activity intolerance, difficulty understanding, impaired physical strength, lacks appropriate home exercise program and pain with function  Understanding of Dx/Px/POC: fair   Prognosis: good  Prognosis details:       Goals  Patient will be independent with home exercise program    Patient will be able to manage symptoms independently  Patient will be able to turn to look over a shoulder to back out of a parking space without limitation due to pain  Patient will be able to look up without limitation due to pain  Patient will be able to look up to change a bulb overhead without limitation due to pain  Plan  Plan details: Prognosis above is given PT services 2x/week tapering to 2x/month over the next 3 months and home program adherence    Patient would benefit from: skilled physical therapy  Planned modality interventions: thermotherapy: hydrocollator packs  Planned therapy interventions: activity modification, joint mobilization, manual therapy, motor coordination training, neuromuscular re-education, patient education, self care, therapeutic activities, therapeutic exercise, graded activity, home exercise program, behavior modification, IADL retraining and postural training  Treatment plan discussed with: patient        Subjective Evaluation    History of Present Illness  Mechanism of injury: 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  Home Life:   Hobbies/exercise:   Pain location: HPI: The pain has been occurring for over a year, the pain started in right bicep area, traveled up upper trap and neck  Sleeping is more comfortable with arm above head  Denies numbness and tingling in the hand  Reports there is sometimes a click which is painful  Turning to the right is worse than looking to the left  Aggravating factors: turning side to side, end of the day is worse than the beginning of the day  Relieving factors: motrin,   Pain  Current pain ratin  At best pain ratin  At worst pain ratin    Patient Goals  Patient goal: ADLs normal house work, playing with grandson picking him up, biking in the summer time        Objective     Static Posture     Comments  ROM:  Cervical:  Flex: 10% limited, tight  Ext: 25% limited, tight  Right Side Bend: 50% limited, painful  Left Side Bend: 75% limited, painful  Right Rotation: 25% limited  Left Rotation: 50% limited, painful    Repeated Motions:  Cervical Retraction: increase in ROM, slight decrease in pain  Cervical Retraction w/ OP: increase in ROM, further decrease in pain  Cervical Retraction w/ Ext: increase in ROM and further decrease pain, - left SB improved the least, most painful movement left    Neural tension/irritation:  Upper Limb Tension Test (ULTT): +;   Tinel’s sign: -;   Elbow flexion with compression: -;   Phalen’s test: -              Precautions: none      Manuals 3/22/2023                                                                Neuro Re-Ed                                                                                           HEP and Return Demo 10'            Ther Ex                                                                                                                     Ther Activity                                       Gait Training                                       Modalities

## 2023-03-22 NOTE — PROGRESS NOTES
PT Evaluation     Today's date: 3/22/2023  Patient name: Adriana Gannon  : 1960  MRN: [id number]  Referring provider: Sarah Hope MD  Dx:   Encounter Diagnosis     ICD-10-CM    1  Cervical pain  M54 2                      Assessment  Assessment details: Adriana Gannon is a pleasant 61 y o  female who presents with cervical pain with right UE radiculopathy  The patient's greatest concerns are worry over not knowing what's wrong and fear of not being able to keep active  No further referral appears necessary at this time based upon examination results  Primary movement impairment diagnosis of poor cervicothoracic movement coordination resulting in pathoanatomical symptoms of Cervical pain  (primary encounter diagnosis) and limiting her ability to exercise or recreation, perform household chores, perform yard work and turn to look over shoulder  Impairments include:  1) poor cervicothoracic movement coordination - addressing with neuromotor retraining   2) central sensitization - addressing with extensive counseling regarding pain neuroscience, diagnosis, prognosis, care options, and care planning  3) UE neural tension - addressing with mobs and mobility exercises   4) poor postural control - addressing with postural retraining    Impairments: abnormal coordination, abnormal muscle firing, abnormal muscle tone, abnormal or restricted ROM, abnormal movement, activity intolerance, difficulty understanding, impaired physical strength, lacks appropriate home exercise program and pain with function  Understanding of Dx/Px/POC: fair   Prognosis: good  Prognosis details:       Goals  Patient will be independent with home exercise program    Patient will be able to manage symptoms independently  Patient will be able to turn to look over a shoulder to back out of a parking space without limitation due to pain  Patient will be able to look up without limitation due to pain     Patient will be able to look up to change a bulb overhead without limitation due to pain  Plan  Plan details: Prognosis above is given PT services 2x/week tapering to 2x/month over the next 3 months and home program adherence  Patient would benefit from: skilled physical therapy  Planned modality interventions: thermotherapy: hydrocollator packs  Planned therapy interventions: activity modification, joint mobilization, manual therapy, motor coordination training, neuromuscular re-education, patient education, self care, therapeutic activities, therapeutic exercise, graded activity, home exercise program, behavior modification, IADL retraining and postural training  Treatment plan discussed with: patient        Subjective Evaluation    History of Present Illness  Mechanism of injury: 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  Home Life:   Hobbies/exercise:   Pain location:   HPI: The pain has been occurring for over a year, the pain started in right bicep area, traveled up upper trap and neck  Sleeping is more comfortable with arm above head  Denies numbness and tingling in the hand  Reports there is sometimes a click which is painful  Turning to the right is worse than looking to the left     Aggravating factors: turning side to side, end of the day is worse than the beginning of the day  Relieving factors: motrin,   Pain  Current pain ratin  At best pain ratin  At worst pain ratin    Patient Goals  Patient goal: ADLs normal house work, playing with grandson picking him up, biking in the summer time        Objective     Static Posture     Comments  ROM:  Cervical:  Flex: 10% limited, tight  Ext: 25% limited, tight  Right Side Bend: 50% limited, painful  Left Side Bend: 75% limited, painful  Right Rotation: 25% limited  Left Rotation: 50% limited, painful    Repeated Motions:  Cervical Retraction: increase in ROM, slight decrease in pain  Cervical Retraction w/ OP: increase in ROM, further decrease in pain  Cervical Retraction w/ Ext: increase in ROM and further decrease pain, - left SB improved the least, most painful movement left    Neural tension/irritation:  Upper Limb Tension Test (ULTT): +;   Tinel’s sign: -;   Elbow flexion with compression: -;   Phalen’s test: -               Precautions: none      Manuals 3/22/2023                                                                Neuro Re-Ed                                                                                           HEP and Return Demo 10'            Ther Ex                                                                                                                     Ther Activity                                       Gait Training                                       Modalities

## 2023-03-23 ENCOUNTER — APPOINTMENT (OUTPATIENT)
Dept: LAB | Facility: CLINIC | Age: 63
End: 2023-03-23

## 2023-03-23 DIAGNOSIS — M54.2 CERVICAL PAIN: ICD-10-CM

## 2023-03-23 DIAGNOSIS — M54.2 NECK PAIN: ICD-10-CM

## 2023-03-23 LAB
T3FREE SERPL-MCNC: 2.75 PG/ML (ref 2.3–4.2)
T4 FREE SERPL-MCNC: 0.75 NG/DL (ref 0.76–1.46)
TSH SERPL DL<=0.05 MIU/L-ACNC: 2.76 UIU/ML (ref 0.45–4.5)

## 2023-03-24 ENCOUNTER — OFFICE VISIT (OUTPATIENT)
Dept: PHYSICAL THERAPY | Facility: MEDICAL CENTER | Age: 63
End: 2023-03-24

## 2023-03-24 DIAGNOSIS — M54.2 CERVICAL PAIN: Primary | ICD-10-CM

## 2023-03-27 ENCOUNTER — OFFICE VISIT (OUTPATIENT)
Dept: PHYSICAL THERAPY | Facility: MEDICAL CENTER | Age: 63
End: 2023-03-27

## 2023-03-27 DIAGNOSIS — M54.2 CERVICAL PAIN: Primary | ICD-10-CM

## 2023-03-27 NOTE — PROGRESS NOTES
"Daily Note     Today's date: 3/27/2023  Patient name: Noemi Lopez  : 1960  MRN: [id number]  Referring provider: Ben Shultz MD  Dx:   Encounter Diagnosis     ICD-10-CM    1  Cervical pain  M54 2                      Subjective: Lauren Joseph reported \"The change in exercises is better, I had some soreness over the weekend  \"      Objective: See treatment diary below      Assessment: Tolerated treatment well  Patient would benefit from continued PT  Plan: Continue per plan of care        Precautions: none      Manuals 3/22/2023 3/24/2023 3/27/2023          Right rotation  JR JR          Manual Retraction  JR                                     Neuro Re-Ed                                                                                           HEP and Return Demo 10'            Ther Ex             Cervical retraction  3\" 3x10 3\" 3x10          R Rotation  3x10 X          Scapular Retraction   3\" 3x10                                                                           Ther Activity                                       Gait Training                                       Modalities                                            "

## 2023-03-30 ENCOUNTER — OFFICE VISIT (OUTPATIENT)
Dept: PHYSICAL THERAPY | Facility: MEDICAL CENTER | Age: 63
End: 2023-03-30

## 2023-03-30 DIAGNOSIS — M54.2 CERVICAL PAIN: Primary | ICD-10-CM

## 2023-03-30 NOTE — PROGRESS NOTES
"Daily Note     Today's date: 3/30/2023  Patient name: Susi Koehler  : 1960  MRN: [id number]  Referring provider: Cisco Quinn MD  Dx:   Encounter Diagnosis     ICD-10-CM    1  Cervical pain  M54 2                      Subjective: Nikita Gazra reported \"I am doing better, I can move better and I can look to the side  \"      Objective: See treatment diary below      Assessment: Tolerated treatment well  Patient would benefit from continued PT  Nikita Garza was able to progress her therapy program as seen below which shows progress towards her goals  Plan: Continue per plan of care        Precautions: none      Manuals 3/22/2023 3/24/2023 3/27/2023 3/30/2023         Right rotation  JR JR JR         Manual Retraction  JR                                     Neuro Re-Ed                                                                                           HEP and Return Demo 10'            Ther Ex             Cervical retraction  3\" 3x10 3\" 3x10 3\" 3x10         R Rotation  3x10 X          Scapular Retraction   3\" 3x10 3\" 3x10                                                                          Ther Activity                                       Gait Training                                       Modalities                                            "

## 2023-04-05 ENCOUNTER — OFFICE VISIT (OUTPATIENT)
Dept: PHYSICAL THERAPY | Facility: MEDICAL CENTER | Age: 63
End: 2023-04-05

## 2023-04-05 DIAGNOSIS — M54.2 CERVICAL PAIN: Primary | ICD-10-CM

## 2023-04-05 NOTE — PROGRESS NOTES
Assessment/Plan:    pap is up to date    Discussed her density and intermediate risk  discussed additional screening with ABUS  She is interested in this RX given and she will schedule along with an ABUS    Discussed self breast exams    colon cancer screening - colonoscopy in 2017, she just got a reminder and she will schedule    discussed preventive care, regular exercise and a healthy diet      No problem-specific Assessment & Plan notes found for this encounter  Diagnoses and all orders for this visit:    Encounter for annual routine gynecological examination    Encounter for screening mammogram for breast cancer          Subjective:      Patient ID: Juventino Craft is a 61 y o  female  Patient here for yearly  She has no GYN complaints  She feels that her vaginal dryness is somewhat improved  She had an incidence of memory loss this summer  She was on vacation and went to the ER and they diagnosed her as having epilepsy  She was started on Keppra  She then followed up with a neurologist here when she came home  He did not feel that she had a seizure and stopped her Keppra  All of her symptoms were very short-lived  They diagnosed it as transient global amnesia  Normal 3D mammogram in 2021 with dense tissue and intermediate risk  ABUS ordered but she has not had this done and is due for mammogram    Normal Pap, negative HPV in 2022          The following portions of the patient's history were reviewed and updated as appropriate: allergies, current medications, past family history, past medical history, past social history, past surgical history and problem list     Review of Systems   Constitutional: Negative  Gastrointestinal: Negative  Genitourinary: Negative  Objective: There were no vitals taken for this visit  Physical Exam  Vitals reviewed  Constitutional:       Appearance: She is well-developed  Neck:      Thyroid: No thyromegaly        Trachea: No tracheal deviation  Cardiovascular:      Rate and Rhythm: Normal rate and regular rhythm  Pulmonary:      Effort: Pulmonary effort is normal       Breath sounds: Normal breath sounds  Chest:   Breasts:     Breasts are symmetrical       Right: No inverted nipple, mass, nipple discharge, skin change or tenderness  Left: No inverted nipple, mass, nipple discharge, skin change or tenderness  Abdominal:      General: There is no distension  Palpations: Abdomen is soft  There is no mass  Tenderness: There is no abdominal tenderness  Genitourinary:     Labia:         Right: No rash, tenderness, lesion or injury  Left: No rash, tenderness, lesion or injury  Vagina: Normal       Cervix: No cervical motion tenderness, discharge or friability  Adnexa:         Right: No mass, tenderness or fullness  Left: No mass, tenderness or fullness          Rectum: Normal       Comments: Atrophic changes

## 2023-04-05 NOTE — PROGRESS NOTES
"Daily Note     Today's date: 2023  Patient name: Deborah Tarango  : 1960  MRN: [id number]  Referring provider: Madyson Traylor MD  Dx:   Encounter Diagnosis     ICD-10-CM    1  Cervical pain  M54 2                      Subjective: Deandre Sparrow reported \"I am doing okay, my motion is better  \"      Objective: See treatment diary below      Assessment: Tolerated treatment well  Patient would benefit from continued PT  Deandre Sparrow demonstrated an increase in ROM following manual therapy which shows progress towards her goals  Plan: Continue per plan of care        Precautions: none      Manuals 3/22/2023 3/24/2023 3/27/2023 3/30/2023 2023        Right rotation  JR JR JR JR        Manual Retraction  JR                                     Neuro Re-Ed                                                                                           HEP and Return Demo 10'            Ther Ex             Cervical retraction  3\" 3x10 3\" 3x10 3\" 3x10 3\" 3x10        R Rotation  3x10 X          Scapular Retraction   3\" 3x10 3\" 3x10 3\" 3x10                                                                         Ther Activity                                       Gait Training                                       Modalities                                            "

## 2023-04-06 ENCOUNTER — ANNUAL EXAM (OUTPATIENT)
Dept: GYNECOLOGY | Facility: CLINIC | Age: 63
End: 2023-04-06

## 2023-04-06 VITALS
SYSTOLIC BLOOD PRESSURE: 118 MMHG | DIASTOLIC BLOOD PRESSURE: 68 MMHG | HEIGHT: 66 IN | BODY MASS INDEX: 23.91 KG/M2 | WEIGHT: 148.8 LBS

## 2023-04-06 DIAGNOSIS — Z12.31 ENCOUNTER FOR SCREENING MAMMOGRAM FOR BREAST CANCER: ICD-10-CM

## 2023-04-06 DIAGNOSIS — Z01.419 ENCOUNTER FOR ANNUAL ROUTINE GYNECOLOGICAL EXAMINATION: Primary | ICD-10-CM

## 2023-04-07 ENCOUNTER — OFFICE VISIT (OUTPATIENT)
Dept: PHYSICAL THERAPY | Facility: MEDICAL CENTER | Age: 63
End: 2023-04-07

## 2023-04-07 DIAGNOSIS — M54.2 CERVICAL PAIN: Primary | ICD-10-CM

## 2023-04-27 ENCOUNTER — TELEPHONE (OUTPATIENT)
Dept: NEUROLOGY | Facility: CLINIC | Age: 63
End: 2023-04-27

## 2023-04-27 NOTE — TELEPHONE ENCOUNTER
----- Message from Jose Alfredo Silva RN sent at 4/27/2023  7:37 AM EDT -----  Regarding: FW: corrections to [address]: [phone]    ----- Message -----  From: Yaritza Turk  Sent: 4/26/2023   9:42 PM EDT  To: [facility]  Subject: corrections to [facility]                           Just following up on the message I sent on April 6 requesting a correction to my medical record  I never received a reply to the request  I am asking for Keppra to be removed from my list of medications, and I'm asking for seizures to be removed from my health summary      Thank you  Denise Francisco

## 2023-10-06 ENCOUNTER — HOSPITAL ENCOUNTER (OUTPATIENT)
Dept: ULTRASOUND IMAGING | Facility: CLINIC | Age: 63
Discharge: HOME | End: 2023-10-06
Payer: COMMERCIAL

## 2023-10-06 ENCOUNTER — HOSPITAL ENCOUNTER (OUTPATIENT)
Dept: MAMMOGRAPHY | Facility: CLINIC | Age: 63
Discharge: HOME | End: 2023-10-06
Payer: COMMERCIAL

## 2023-10-06 VITALS — WEIGHT: 151.01 LBS | HEIGHT: 66 IN | BODY MASS INDEX: 24.27 KG/M2

## 2023-10-06 DIAGNOSIS — Z12.31 ENCOUNTER FOR SCREENING MAMMOGRAM FOR BREAST CANCER: ICD-10-CM

## 2023-10-06 DIAGNOSIS — R92.30 DENSE BREAST TISSUE ON MAMMOGRAM: ICD-10-CM

## 2023-10-06 PROCEDURE — 76641 ULTRASOUND BREAST COMPLETE: CPT

## 2023-10-06 PROCEDURE — 77063 BREAST TOMOSYNTHESIS BI: CPT

## 2023-10-06 PROCEDURE — 77067 SCR MAMMO BI INCL CAD: CPT

## 2024-08-22 ENCOUNTER — TELEPHONE (OUTPATIENT)
Age: 64
End: 2024-08-22

## 2024-08-22 NOTE — TELEPHONE ENCOUNTER
Pt returned call to Luanne regarding earlier appt. Pt declined at this time and would like to keep her upcoming appt for 9/19 with Dr. Padilla. Thank you.   Pt found to be febrile, tachycardiac, tachypneic, hypoxic, with leukocytosis, positive COVID19, equivocal lung findings in CT chest  - Dx: hypoxic respiratory failure 2/2 COVID-19, superimposed PNA, and COPD exacerbation  s/p intubation and MICU transfer on 7/26, successfully extubated 7/29 and transferred to floors 7/30.    - completed COVID treatment (decadron 7/24-8/1), (remdesevir 7/23-7/27)  - completed empiric abx after aspiration event on 7/26  - Discontinued Symbicort, PRN albuterol ordered Pt found to be febrile, tachycardiac, tachypneic, hypoxic, with leukocytosis, positive COVID19, equivocal lung findings in CT chest  - Dx: hypoxic respiratory failure 2/2 COVID-19, superimposed PNA, and COPD exacerbation  s/p intubation and MICU transfer on 7/26, successfully extubated 7/29 and transferred to floors 7/30.    - completed COVID treatment (decadron 7/24-8/1), (remdesevir 7/23-7/27)  - completed empiric abx after aspiration event on 7/26  - c/w Symbicort, PRN albuterol ordered

## 2024-09-19 ENCOUNTER — ANNUAL EXAM (OUTPATIENT)
Dept: GYNECOLOGY | Facility: CLINIC | Age: 64
End: 2024-09-19
Payer: COMMERCIAL

## 2024-09-19 VITALS
WEIGHT: 151 LBS | BODY MASS INDEX: 24.27 KG/M2 | HEIGHT: 66 IN | DIASTOLIC BLOOD PRESSURE: 68 MMHG | SYSTOLIC BLOOD PRESSURE: 124 MMHG

## 2024-09-19 DIAGNOSIS — Z11.51 SCREENING FOR HPV (HUMAN PAPILLOMAVIRUS): ICD-10-CM

## 2024-09-19 DIAGNOSIS — Z12.39 SCREENING FOR BREAST CANCER USING NON-MAMMOGRAM MODALITY: ICD-10-CM

## 2024-09-19 DIAGNOSIS — R92.30 DENSE BREAST TISSUE ON MAMMOGRAM, UNSPECIFIED TYPE: ICD-10-CM

## 2024-09-19 DIAGNOSIS — Z12.31 ENCOUNTER FOR SCREENING MAMMOGRAM FOR BREAST CANCER: ICD-10-CM

## 2024-09-19 DIAGNOSIS — Z01.419 ENCOUNTER FOR ANNUAL ROUTINE GYNECOLOGICAL EXAMINATION: Primary | ICD-10-CM

## 2024-09-19 PROCEDURE — G0476 HPV COMBO ASSAY CA SCREEN: HCPCS | Performed by: OBSTETRICS & GYNECOLOGY

## 2024-09-19 PROCEDURE — G0124 SCREEN C/V THIN LAYER BY MD: HCPCS | Performed by: STUDENT IN AN ORGANIZED HEALTH CARE EDUCATION/TRAINING PROGRAM

## 2024-09-19 PROCEDURE — 99396 PREV VISIT EST AGE 40-64: CPT | Performed by: OBSTETRICS & GYNECOLOGY

## 2024-09-19 PROCEDURE — G0145 SCR C/V CYTO,THINLAYER,RESCR: HCPCS | Performed by: STUDENT IN AN ORGANIZED HEALTH CARE EDUCATION/TRAINING PROGRAM

## 2024-09-19 NOTE — PROGRESS NOTES
Ambulatory Visit  Name: Lorie Madrigal      : 1960      MRN: [id number]  Encounter Provider: Jammie Padilla DO  Encounter Date: 2024   Encounter department: [facility] [place]    Assessment & Plan  Encounter for annual routine gynecological examination         Encounter for screening mammogram for breast cancer    Orders:    Mammo screening bilateral w 3d and cad; Future    Dense breast tissue on mammogram, unspecified type    Orders:    US breast screening bilateral complete (ABUS); Future    Screening for breast cancer using non-mammogram modality    Orders:    US breast screening bilateral complete (ABUS); Future    Pap and HPV done today, we discussed the guidelines.    mammogram reviewed with her including breast density.  RX given for next month  ABUS also ordered.  She will decide if she is going to have this.  She is switching over to Medicare early next year and has to determine if it is covered.  Discussed self breast exams    colon cancer screening-colonoscopy in , recall in 10 years    discussed preventive care, regular exercise and a healthy diet    History of Present Illness     Lorie Madrigal is a 64 y.o. female who presents for yearly.  She has no GYN complaints    Normal 3D mammogram and normal ABUS last October.  She has dense tissue and intermediate risk.  She has had difficulties with getting payment ABUS    Normal pap, negative HPV in 3-2022      Review of Systems   Constitutional: Negative.    Gastrointestinal: Negative.    Genitourinary: Negative.            Objective     There were no vitals taken for this visit.    Physical Exam  Vitals and nursing note reviewed. Exam conducted with a chaperone present.   Constitutional:       Appearance: She is well-developed.   HENT:      Head: Normocephalic and atraumatic.   Neck:      Thyroid: No thyromegaly.   Cardiovascular:      Rate and Rhythm: Normal rate and regular rhythm.   Pulmonary:      Effort: Pulmonary  effort is normal.      Breath sounds: Normal breath sounds.   Chest:   Breasts:     Right: Normal.      Left: Normal.      Comments: Examined seated and supine  Abdominal:      Palpations: Abdomen is soft.   Genitourinary:     Vagina: Normal.      Cervix: Normal.      Uterus: Normal.       Adnexa: Right adnexa normal and left adnexa normal.      Rectum: Normal.   Musculoskeletal:      Cervical back: Neck supple.   Skin:     General: Skin is warm and dry.      Capillary Refill: Capillary refill takes less than 2 seconds.   Neurological:      Mental Status: She is alert.

## 2024-09-26 LAB
LAB AP GYN PRIMARY INTERPRETATION: ABNORMAL
Lab: ABNORMAL
PATH INTERP SPEC-IMP: ABNORMAL

## 2025-01-28 ENCOUNTER — PREP FOR PROCEDURE (OUTPATIENT)
Age: 65
End: 2025-01-28

## 2025-01-28 ENCOUNTER — TELEPHONE (OUTPATIENT)
Age: 65
End: 2025-01-28

## 2025-01-28 DIAGNOSIS — Z12.11 SCREENING FOR COLON CANCER: Primary | ICD-10-CM

## 2025-01-28 NOTE — TELEPHONE ENCOUNTER
01/28/25  Screened by: Lilly Perez    Referring Provider     Pre- Screening:     There is no height or weight on file to calculate BMI.151lbs 24.75  Has patient been referred for a routine screening Colonoscopy? yes  Is the patient between 45-75 years old? yes      Previous Colonoscopy yes   If yes:8 years    Date:     Facility:     Reason:       Does the patient want to see a Gastroenterologist prior to their procedure OR are they having any GI symptoms? no    Has the patient been hospitalized or had abdominal surgery in the past 6 months? no    Does the patient use supplemental oxygen? no    Does the patient take Coumadin, Lovenox, Plavix, Elliquis, Xarelto, or other blood thinning medication? no    Has the patient had a stroke, cardiac event, or stent placed in the past year? no      If patient is between 45yrs - 49yrs, please advise patient that we will have to confirm benefits & coverage with their insurance company for a routine screening colonoscopy.

## 2025-01-28 NOTE — TELEPHONE ENCOUNTER
Scheduled date of colonoscopy (as of today):3/27/2025  Physician performing colonoscopy:Dr Eyal Rosa  Location of colonoscopy:[place]  Bowel prep reviewed with patient:Miralax/Dulcolax  Instructions reviewed with patient by:sent via letter  Clearances: N/A

## 2025-01-28 NOTE — LETTER
Attached are your prep instructions for your upcoming procedure on 3/27/2025. If you have any questions or concerns please contact us at [phone].                Thank you,      [facility]

## 2025-03-13 ENCOUNTER — ANESTHESIA (OUTPATIENT)
Dept: ANESTHESIOLOGY | Facility: HOSPITAL | Age: 65
End: 2025-03-13

## 2025-03-13 ENCOUNTER — ANESTHESIA EVENT (OUTPATIENT)
Dept: ANESTHESIOLOGY | Facility: HOSPITAL | Age: 65
End: 2025-03-13

## 2025-03-27 ENCOUNTER — ANESTHESIA (OUTPATIENT)
Dept: GASTROENTEROLOGY | Facility: MEDICAL CENTER | Age: 65
End: 2025-03-27
Payer: COMMERCIAL

## 2025-03-27 ENCOUNTER — ANESTHESIA EVENT (OUTPATIENT)
Dept: GASTROENTEROLOGY | Facility: MEDICAL CENTER | Age: 65
End: 2025-03-27
Payer: COMMERCIAL

## 2025-03-27 ENCOUNTER — HOSPITAL ENCOUNTER (OUTPATIENT)
Dept: GASTROENTEROLOGY | Facility: MEDICAL CENTER | Age: 65
Setting detail: OUTPATIENT SURGERY
Discharge: HOME | End: 2025-03-27
Attending: INTERNAL MEDICINE
Payer: COMMERCIAL

## 2025-03-27 VITALS
DIASTOLIC BLOOD PRESSURE: 70 MMHG | SYSTOLIC BLOOD PRESSURE: 108 MMHG | BODY MASS INDEX: 24.75 KG/M2 | RESPIRATION RATE: 20 BRPM | HEART RATE: 80 BPM | OXYGEN SATURATION: 97 % | TEMPERATURE: 98 F | WEIGHT: 151 LBS

## 2025-03-27 DIAGNOSIS — Z12.11 SCREENING FOR COLON CANCER: ICD-10-CM

## 2025-03-27 PROCEDURE — G0121 COLON CA SCRN NOT HI RSK IND: HCPCS | Performed by: INTERNAL MEDICINE

## 2025-03-27 RX ORDER — PROPOFOL 10 MG/ML
INJECTION, EMULSION INTRAVENOUS AS NEEDED
Status: DISCONTINUED | OUTPATIENT
Start: 2025-03-27 | End: 2025-03-27

## 2025-03-27 RX ORDER — SODIUM CHLORIDE, SODIUM LACTATE, POTASSIUM CHLORIDE, CALCIUM CHLORIDE 600; 310; 30; 20 MG/100ML; MG/100ML; MG/100ML; MG/100ML
125 INJECTION, SOLUTION INTRAVENOUS CONTINUOUS
Status: DISCONTINUED | OUTPATIENT
Start: 2025-03-27 | End: 2025-03-31 | Stop reason: HOSPADM

## 2025-03-27 RX ORDER — ONDANSETRON 2 MG/ML
4 INJECTION INTRAMUSCULAR; INTRAVENOUS ONCE AS NEEDED
Status: DISCONTINUED | OUTPATIENT
Start: 2025-03-27 | End: 2025-03-31 | Stop reason: HOSPADM

## 2025-03-27 RX ADMIN — PROPOFOL 30 MG: 10 INJECTION, EMULSION INTRAVENOUS at 07:45

## 2025-03-27 RX ADMIN — SODIUM CHLORIDE, SODIUM LACTATE, POTASSIUM CHLORIDE, AND CALCIUM CHLORIDE 125 ML/HR: .6; .31; .03; .02 INJECTION, SOLUTION INTRAVENOUS at 07:20

## 2025-03-27 RX ADMIN — PROPOFOL 30 MG: 10 INJECTION, EMULSION INTRAVENOUS at 07:37

## 2025-03-27 RX ADMIN — PROPOFOL 100 MG: 10 INJECTION, EMULSION INTRAVENOUS at 07:34

## 2025-03-27 NOTE — ANESTHESIA PREPROCEDURE EVALUATION
Procedure:  COLONOSCOPY    Relevant Problems   No relevant active problems      HTN and hypothyroid     NPO 245am 3/27 prep    Physical Exam    Airway    Mallampati score: II  TM Distance: >3 FB  Neck ROM: full     Dental       Cardiovascular  Cardiovascular exam normal    Pulmonary  Pulmonary exam normal     Other Findings  post-pubertal.      Anesthesia Plan  ASA Score- 2     Anesthesia Type- IV sedation with anesthesia with ASA Monitors.         Additional Monitors:     Airway Plan:            Plan Factors-Exercise tolerance (METS): >4 METS.    Chart reviewed. EKG reviewed.  Existing labs reviewed. Patient summary reviewed.          Obstructive sleep apnea risk education given perioperatively.        Induction- intravenous.    Postoperative Plan-     Perioperative Resuscitation Plan - Level 1 - Full Code.       Informed Consent- Anesthetic plan and risks discussed with patient and spouse.  I personally reviewed this patient with the CRNA. Discussed and agreed on the Anesthesia Plan with the CRNA..      NPO Status:  Vitals Value Taken Time   Date of last liquid 03/27/25 03/27/25 0656   Time of last liquid 0245 03/27/25 0656   Date of last solid 03/25/25 03/27/25 0656   Time of last solid 1800 03/27/25 0656

## 2025-05-03 ENCOUNTER — HOSPITAL ENCOUNTER (OUTPATIENT)
Dept: CT IMAGING | Facility: HOSPITAL | Age: 65
Discharge: HOME | End: 2025-05-03
Attending: FAMILY MEDICINE
Payer: COMMERCIAL

## 2025-05-03 DIAGNOSIS — E78.5 HYPERLIPIDEMIA, UNSPECIFIED: ICD-10-CM

## 2025-05-03 DIAGNOSIS — R79.89 OTHER SPECIFIED ABNORMAL FINDINGS OF BLOOD CHEMISTRY: ICD-10-CM

## 2025-05-03 PROCEDURE — 75571 CT HRT W/O DYE W/CA TEST: CPT

## 2025-05-13 ENCOUNTER — HOSPITAL ENCOUNTER (OUTPATIENT)
Dept: MAMMOGRAPHY | Facility: MEDICAL CENTER | Age: 65
Discharge: HOME | End: 2025-05-13
Payer: COMMERCIAL

## 2025-05-13 VITALS — WEIGHT: 151 LBS | HEIGHT: 66 IN | BODY MASS INDEX: 24.27 KG/M2

## 2025-05-13 DIAGNOSIS — Z12.31 ENCOUNTER FOR SCREENING MAMMOGRAM FOR BREAST CANCER: ICD-10-CM

## 2025-05-13 PROCEDURE — 77067 SCR MAMMO BI INCL CAD: CPT

## 2025-05-13 PROCEDURE — 77063 BREAST TOMOSYNTHESIS BI: CPT

## 2025-05-19 ENCOUNTER — RESULTS FOLLOW-UP (OUTPATIENT)
Dept: GYNECOLOGY | Facility: CLINIC | Age: 65
End: 2025-05-19

## 2025-06-03 ENCOUNTER — TELEPHONE (OUTPATIENT)
Dept: MAMMOGRAPHY | Facility: CLINIC | Age: 65
End: 2025-06-03

## 2025-06-04 NOTE — PROGRESS NOTES
"Daily Note     Today's date: 2023  Patient name: Demetra Rodriguez  : 1960  MRN: [id number]  Referring provider: Stacie Michaud MD  Dx:   Encounter Diagnosis     ICD-10-CM    1  Cervical pain  M54 2                      Subjective: Jaspreet Siddiqui reported \"I am moving a little bit better  \"      Objective: See treatment diary below      Assessment: Tolerated treatment well  Patient would benefit from continued PT  Jaspreet Siddiqui was able to demonstrate full ROM following manual therapy  Plan: Continue per plan of care        Precautions: none      Manuals 3/22/2023 3/24/2023 3/27/2023 3/30/2023 2023 2023       Right rotation  JR Tyler Jain JR       Manual Retraction  JR                                     Neuro Re-Ed                                                                                           HEP and Return Demo 10'            Ther Ex             Cervical retraction  3\" 3x10 3\" 3x10 3\" 3x10 3\" 3x10 3\" 3x10       R Rotation  3x10 X   3x10\"       Scapular Retraction   3\" 3x10 3\" 3x10 3\" 3x10 3\" 3x10                                                                        Ther Activity                                       Gait Training                                       Modalities                                            " Patient requesting to speak with  regarding Jury Duty letter.    Writer crista transferred patient to [facility].

## 2025-06-18 ENCOUNTER — HOSPITAL ENCOUNTER (OUTPATIENT)
Dept: ULTRASOUND IMAGING | Facility: CLINIC | Age: 65
Discharge: HOME | End: 2025-06-18
Attending: OBSTETRICS & GYNECOLOGY
Payer: COMMERCIAL

## 2025-06-18 DIAGNOSIS — R92.8 ABNORMAL MAMMOGRAM: ICD-10-CM

## 2025-06-18 PROCEDURE — 76642 ULTRASOUND BREAST LIMITED: CPT
